# Patient Record
Sex: FEMALE | Race: BLACK OR AFRICAN AMERICAN | HISPANIC OR LATINO | URBAN - METROPOLITAN AREA
[De-identification: names, ages, dates, MRNs, and addresses within clinical notes are randomized per-mention and may not be internally consistent; named-entity substitution may affect disease eponyms.]

---

## 2020-10-09 ENCOUNTER — WEB ENCOUNTER (OUTPATIENT)
Dept: URBAN - METROPOLITAN AREA CLINIC 40 | Facility: CLINIC | Age: 70
End: 2020-10-09

## 2020-10-12 ENCOUNTER — OFFICE VISIT (OUTPATIENT)
Dept: URBAN - METROPOLITAN AREA TELEHEALTH 2 | Facility: TELEHEALTH | Age: 70
End: 2020-10-12

## 2020-10-12 NOTE — HPI-TODAY'S VISIT:
History Of Present Illness    This is a follow-up appointment for this patient, a 70 year old /Black female, after a previous visit on11/12/2019 , for an evaluation for abdominal pain, N/V and GERD. The patient admits to alternating bowel habits with no GI bleeding.   She was previously followed in [facility] office by Dr. Santos on 7/18/18 for hospital follow up for rectal bleeding. The patient had been experiencing symptoms for months. She did have flex sig in August 2018 with banding, normal colonoscopy with hemorrhoid banding in November 2018. Normal ileocolonic anastomosis. Prior hemorrhoid banding in 2017. No recurrent rectal bleeding. Eating somewhat high starch diet , very small portions daily and supplementing with occasional high protein shakes due to gastroparesis as suggested with EGD in 2016.  Alternating stool consistency. Diverticulosis with no recent diverticulitis.  Intermittent N/V, no hematemesis. Takes pantoprazole daily with relief of symptoms since completely normal EGD on 10/28 by Dr. Hernandez and addition of both bethanechol and carafate bid. Rare dysphagia. Denies CP, SOB. Even feels as though her energy has increased. 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

## 2021-10-01 ENCOUNTER — WEB ENCOUNTER (OUTPATIENT)
Dept: URBAN - METROPOLITAN AREA CLINIC 40 | Facility: CLINIC | Age: 71
End: 2021-10-01

## 2021-10-01 ENCOUNTER — OFFICE VISIT (OUTPATIENT)
Dept: URBAN - METROPOLITAN AREA CLINIC 40 | Facility: CLINIC | Age: 71
End: 2021-10-01
Payer: MEDICARE

## 2021-10-01 VITALS
BODY MASS INDEX: 29.26 KG/M2 | DIASTOLIC BLOOD PRESSURE: 90 MMHG | SYSTOLIC BLOOD PRESSURE: 144 MMHG | WEIGHT: 159 LBS | TEMPERATURE: 97.7 F | HEIGHT: 62 IN | HEART RATE: 90 BPM

## 2021-10-01 DIAGNOSIS — K57.30 DIVERTICULA OF COLON: ICD-10-CM

## 2021-10-01 DIAGNOSIS — K31.84 GASTROPARESIS: ICD-10-CM

## 2021-10-01 DIAGNOSIS — Z90.49 HISTORY OF COLECTOMY: ICD-10-CM

## 2021-10-01 PROBLEM — 442461003 HISTORY OF EXCISION OF INTESTINAL STRUCTURE: Status: ACTIVE | Noted: 2021-10-01

## 2021-10-01 PROCEDURE — 99212 OFFICE O/P EST SF 10 MIN: CPT | Performed by: PHYSICIAN ASSISTANT

## 2021-10-01 RX ORDER — BETHANECHOL CHLORIDE 25 MG/1
1 TABLET 1 HOUR BEFORE OR 2 HOURS AFTER MEALS TABLET ORAL THREE TIMES A DAY
OUTPATIENT

## 2021-10-01 RX ORDER — BETHANECHOL CHLORIDE 25 MG/1
1 TABLET 1 HOUR BEFORE OR 2 HOURS AFTER MEALS TABLET ORAL THREE TIMES A DAY
Status: ACTIVE | COMMUNITY

## 2021-10-01 NOTE — HPI-TODAY'S VISIT:
Ms. Arteaga is a 71 year old /Black female, after a previous visit on 11/12/2019 , for an evaluation for abdominal pain, N/V and GERD. The patient admits to alternating bowel habits with no GI bleeding. She was previously followed in [facility] office by Dr. Santos on 7/18/18 for hospital follow up for rectal bleeding. The patient had been experiencing symptoms for months. She did have flex sig in August 2018 with banding, normal colonoscopy with hemorrhoid banding x2 late 2018 by Dr. Boston. Normal ileocolonic anastomosis. Prior hemorrhoid banding  in 2017. No recurrent rectal bleeding. Has diverticulosis. Eating somewhat high starch diet , very small portions daily and supplementing with occasional high protein shakes due to gastroparesis as suggested with EGD in 2016.  Benign colon polyp removed from rectum during colonoscopy in 2016 where small internal hemorrhoids noted at the time. Past history of intermittent N/V, no hematemesis. Takes pantoprazole daily with relief of symptoms since completely normal EGD on 10/28 by Dr. Hernandez and addition of both bethanechol and carafate bid. Rare dysphagia. Denies CP, SOB. Even feels as though her energy has increased. 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 Admits her UGI complaints resolved with bethanechol prn for gastroparesis. Eating smaller portions and diversifying diet. No GI complaints today.

## 2021-12-10 ENCOUNTER — OFFICE VISIT (OUTPATIENT)
Dept: URBAN - METROPOLITAN AREA CLINIC 40 | Facility: CLINIC | Age: 71
End: 2021-12-10

## 2021-12-10 RX ORDER — BETHANECHOL CHLORIDE 25 MG/1
1 TABLET 1 HOUR BEFORE OR 2 HOURS AFTER MEALS TABLET ORAL THREE TIMES A DAY
Status: ACTIVE | COMMUNITY

## 2022-08-23 ENCOUNTER — DASHBOARD ENCOUNTERS (OUTPATIENT)
Age: 72
End: 2022-08-23

## 2022-08-23 ENCOUNTER — OFFICE VISIT (OUTPATIENT)
Dept: URBAN - METROPOLITAN AREA CLINIC 40 | Facility: CLINIC | Age: 72
End: 2022-08-23

## 2022-08-23 PROBLEM — 235675006 GASTROPARESIS: Status: ACTIVE | Noted: 2021-10-01

## 2022-08-23 PROBLEM — 398050005 DIVERTICULAR DISEASE OF COLON: Status: ACTIVE | Noted: 2021-10-01

## 2022-08-23 PROBLEM — 427816007 HISTORY OF COLECTOMY: Status: ACTIVE | Noted: 2022-08-23

## 2022-08-23 RX ORDER — BETHANECHOL CHLORIDE 25 MG/1
1 TABLET 1 HOUR BEFORE OR 2 HOURS AFTER MEALS TABLET ORAL THREE TIMES A DAY
Status: ACTIVE | COMMUNITY

## 2022-10-04 ENCOUNTER — OFFICE VISIT (OUTPATIENT)
Dept: URBAN - METROPOLITAN AREA CLINIC 40 | Facility: CLINIC | Age: 72
End: 2022-10-04